# Patient Record
Sex: FEMALE | Race: WHITE
[De-identification: names, ages, dates, MRNs, and addresses within clinical notes are randomized per-mention and may not be internally consistent; named-entity substitution may affect disease eponyms.]

---

## 2021-10-28 PROBLEM — E78.2 HYPERLIPIDEMIA, MIXED: Status: ACTIVE | Noted: 2021-10-28

## 2021-10-28 PROBLEM — I10 HYPERTENSION, ESSENTIAL: Status: ACTIVE | Noted: 2021-10-28

## 2022-04-05 PROBLEM — H65.193 ACUTE MIDDLE EAR EFFUSION, BILATERAL: Status: ACTIVE | Noted: 2022-04-05

## 2022-04-05 PROBLEM — J00 ACUTE RHINITIS: Status: ACTIVE | Noted: 2022-04-05

## 2022-12-03 ENCOUNTER — NURSE TRIAGE (OUTPATIENT)
Dept: OTHER | Facility: CLINIC | Age: 52
End: 2022-12-03

## 2022-12-03 NOTE — TELEPHONE ENCOUNTER
Location of patient: [place]    Subjective: Caller states \"fever for 2 days\"     Current Symptoms: chills, fever    Onset: 3 days ago; worsening    Associated Symptoms: reduced appetite    Pain Severity: 0/10; N/A; none    Temperature: 101.2 orally    What has been tried: advil, nyquil    LMP: NA Pregnant: NA    Recommended disposition: See PCP within 24 Hours    Care advice provided, patient verbalizes understanding; denies any other questions or concerns; instructed to call back for any new or worsening symptoms. Patient/caller agrees to proceed to nearest [facility]     This triage is a result of a call to [address] [place] [address]. Please do not respond to the triage nurse through this encounter. Any subsequent communication should be directly with the patient.       Reason for Disposition   Fever present > 3 days (72 hours)    Protocols used: Fever-ADULT-AH